# Patient Record
Sex: MALE | Race: WHITE | Employment: FULL TIME | URBAN - METROPOLITAN AREA
[De-identification: names, ages, dates, MRNs, and addresses within clinical notes are randomized per-mention and may not be internally consistent; named-entity substitution may affect disease eponyms.]

---

## 2021-03-18 PROBLEM — F41.1 GENERALIZED ANXIETY DISORDER: Status: ACTIVE | Noted: 2017-05-09

## 2021-03-18 PROBLEM — E66.9 OBESITY, UNSPECIFIED: Status: ACTIVE | Noted: 2018-10-12

## 2021-11-01 PROBLEM — E78.1 PURE HYPERTRIGLYCERIDEMIA: Status: ACTIVE | Noted: 2021-11-01

## 2021-11-01 PROBLEM — F98.8 ATTENTION DEFICIT DISORDER (ADD) WITHOUT HYPERACTIVITY: Status: ACTIVE | Noted: 2021-11-01

## 2021-11-01 PROBLEM — I10 PRIMARY HYPERTENSION: Status: ACTIVE | Noted: 2021-11-01

## 2021-11-01 PROBLEM — L85.3 DRY SKIN: Status: ACTIVE | Noted: 2021-11-01

## 2022-01-28 PROBLEM — N20.0 RENAL CALCULI: Status: ACTIVE | Noted: 2022-01-28

## 2022-01-28 PROBLEM — R31.9 HEMATURIA: Status: ACTIVE | Noted: 2022-01-28

## 2022-03-24 ENCOUNTER — TELEPHONE (OUTPATIENT)
Dept: UROLOGY | Age: 57
End: 2022-03-24

## 2022-03-24 DIAGNOSIS — D68.01 VON WILLEBRAND DISEASE, TYPE I: Primary | ICD-10-CM

## 2022-03-24 RX ORDER — CEFPODOXIME PROXETIL 200 MG/1
200 TABLET, FILM COATED ORAL 2 TIMES DAILY
Qty: 20 TABLET | Refills: 0 | Status: SHIPPED | OUTPATIENT
Start: 2022-03-24 | End: 2022-04-03

## 2022-03-24 NOTE — TELEPHONE ENCOUNTER
Can we have a referral placed for Dr. Joel Griffin office. He is scheduled with them on 04/20/22. Thank you!

## 2022-03-24 NOTE — TELEPHONE ENCOUNTER
Patient informed and voiced understanding. I just spoke with Dr Cephus Holter office they stated they are not able to get him in until Monday. Do you want me to call Dr Aquilla Boast and see if they can get him in today?

## 2022-03-24 NOTE — TELEPHONE ENCOUNTER
Urine culture is positive for infection. We sent in culture specific cefpodoxime. Take this antibiotic until gone. Take this with food and eat yogurt once per day to prevent GI upset. If you develop nausea, vomiting, or fevers call the office or go to the ER. Did he get an apt with hematology?

## 2022-03-24 NOTE — TELEPHONE ENCOUNTER
Spoke with Dr. Risa Carlos. Recommendation for DDAVP one hour prior to surgery. Make a f/u apt with him in 2-4 weeks after his labs are finalized. Call patient and let him know we will give him medication in an IV, one hour prior to surgery. Please let surgery know he will need:    IV DDAVP 0.3 mcg/kg (maximum 20 mcg) diluted in 50 mL of normal saline and infused over 20 to 30 minutes one hour prior to procedure.  [I will attempt to order this in his encounter for surgery]

## 2022-03-24 NOTE — TELEPHONE ENCOUNTER
I called and spoke with Dr. Saurabh Ospina nurse. She is going to talk to Dr. Saurabh Ospina and give us a call back after.

## 2022-03-24 NOTE — TELEPHONE ENCOUNTER
Let patient know to keep surgery as planned. I will be talking to hematology about dosing of medication prior to surgery. We will let him know as soon as we hear anything.

## 2022-03-30 PROBLEM — N20.1 URETERAL CALCULUS: Status: ACTIVE | Noted: 2022-03-30

## 2022-05-02 PROBLEM — E66.01 CLASS 2 SEVERE OBESITY DUE TO EXCESS CALORIES WITH SERIOUS COMORBIDITY AND BODY MASS INDEX (BMI) OF 36.0 TO 36.9 IN ADULT (HCC): Status: ACTIVE | Noted: 2018-10-12

## 2022-08-04 PROBLEM — E78.2 MIXED HYPERLIPIDEMIA: Status: ACTIVE | Noted: 2022-08-04

## 2022-08-04 PROBLEM — N52.9 ERECTILE DYSFUNCTION: Status: ACTIVE | Noted: 2022-08-04
